# Patient Record
Sex: MALE | Race: WHITE
[De-identification: names, ages, dates, MRNs, and addresses within clinical notes are randomized per-mention and may not be internally consistent; named-entity substitution may affect disease eponyms.]

---

## 2020-01-16 ENCOUNTER — HOSPITAL ENCOUNTER (OUTPATIENT)
Dept: HOSPITAL 74 - FASU | Age: 50
Discharge: HOME | End: 2020-01-16
Attending: ORTHOPAEDIC SURGERY
Payer: COMMERCIAL

## 2020-01-16 VITALS — HEART RATE: 68 BPM | DIASTOLIC BLOOD PRESSURE: 78 MMHG | SYSTOLIC BLOOD PRESSURE: 136 MMHG

## 2020-01-16 VITALS — TEMPERATURE: 97.5 F

## 2020-01-16 VITALS — BODY MASS INDEX: 34.7 KG/M2
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
## 2020-01-16 DIAGNOSIS — S83.242A: Primary | ICD-10-CM

## 2020-01-16 PROCEDURE — 0SBD4ZZ EXCISION OF LEFT KNEE JOINT, PERCUTANEOUS ENDOSCOPIC APPROACH: ICD-10-PCS | Performed by: ORTHOPAEDIC SURGERY

## 2020-01-16 NOTE — OP
DATE OF OPERATION:  01/16/2020

 

POSTOPERATIVE DIAGNOSIS:  Left knee medial meniscal tear.

 

POSTOPERATIVE DIAGNOSIS:  Left knee medial meniscal tear.

 

PROCEDURE:  Left knee arthroscopy with partial medial meniscectomy.

 

SURGEON:  Douglas Hollis MD 

 

ASSISTANT:  Sil Walker, physician assistant, whose skilled full assistance was

necessary for the safe and timely performance of this procedure.  

 

ANESTHESIA:  General.

 

POSTOPERATIVE CONDITION:  Stable.

 

COMPLICATIONS:  None.

 

INDICATIONS:  This is a pleasant gentleman who had previously underwent knee

arthroscopy and had meniscal tear.  He was then found to have recurrent meniscal

tear.  Treatment options were discussed including nonoperative versus operative

management.  Operative risks were reviewed in detail including bleeding, infection,

neurovascular injury, need for further surgery, postoperative pain and stiffness,

progression of osteoarthritis.  We discussed medical risks such as heart attack,

stroke, DVT, PE, and death.  I addressed the use of perioperative antibiotic and DVT

prophylaxis.  I reviewed the postoperative rehabilitation protocol.  The patient

voiced understanding and elected to proceed.

 

DESCRIPTION OF PROCEDURE:  Patient was brought to the operating room where general

anesthesia was administered.  The left lower extremity was then prepped and draped in

the usual sterile fashion.  A preoperative dose of antibiotics was given, and the

usual time-out procedure was performed.  The portals then were marked out on the

skin.  The local anesthetic was injected subcutaneously at the portal sites.  An 11

blade was now used to establish a lateral portal.  The arthroscope was passed into

the knee.  Examination of the patellofemoral joint demonstrated high-grade cartilage

loss along the lateral patellar facet and moderate-to-high-grade loss along the

medial facet.  The trochlea had only mild streak wear.  Passing the arthroscope into

the medial compartment demonstrated some moderate, partial intra-articular loss on

the femoral side and mild articular loss on the femoral side.  There was recurrent

tearing noted of the medial meniscus especially more so toward the posterior horn but

also involving the body.  Medial portal was established under spinal needle

localization.  Utilizing a combination of meniscal biters and a shaver, this was

debrided down to a stable base.  The arthroscope was now passed into the lateral

compartment.  Here, no meniscal tear was seen.  There was only minimal articular

wear.  At this point, the excess fluid was withdrawn from the joint.  The portals

were sutured using 3-0 nylon.  Sterile dressings were placed.  The patient was

extubated and transferred to the recovery room in stable condition.

 

 

DOUGLAS HOLLIS M.D.

 

[id number]

DD: 01/16/2020 09:33

DT: 01/16/2020 11:52

Job #:  [id number]

## 2020-01-16 NOTE — OP
Operative Note





- Note:


Operative Date: 01/16/20


Pre-Operative Diagnosis: Left medial meniscus tear


Operation: Left knee arthroscopy with partial medial meniscectomy


Post-Operative Diagnosis: Same as Pre-op


Surgeon: Douglas Valdez


Assistant: Sil Walker


Anesthesiologist/CRNA: Parish Milligan


Anesthesia: General


Operative Report Dictated: Yes